# Patient Record
Sex: FEMALE | Race: WHITE | Employment: OTHER | URBAN - METROPOLITAN AREA
[De-identification: names, ages, dates, MRNs, and addresses within clinical notes are randomized per-mention and may not be internally consistent; named-entity substitution may affect disease eponyms.]

---

## 2021-01-19 ENCOUNTER — APPOINTMENT (OUTPATIENT)
Dept: GENERAL RADIOLOGY | Age: 76
End: 2021-01-19
Payer: MEDICARE

## 2021-01-19 ENCOUNTER — APPOINTMENT (OUTPATIENT)
Dept: CT IMAGING | Age: 76
End: 2021-01-19
Payer: MEDICARE

## 2021-01-19 ENCOUNTER — HOSPITAL ENCOUNTER (EMERGENCY)
Age: 76
Discharge: HOME | End: 2021-01-19
Payer: MEDICARE

## 2021-01-19 VITALS
OXYGEN SATURATION: 98 % | SYSTOLIC BLOOD PRESSURE: 158 MMHG | HEIGHT: 68 IN | DIASTOLIC BLOOD PRESSURE: 73 MMHG | TEMPERATURE: 97.7 F | WEIGHT: 195 LBS | BODY MASS INDEX: 29.55 KG/M2 | RESPIRATION RATE: 16 BRPM | HEART RATE: 60 BPM

## 2021-01-19 DIAGNOSIS — S42.291A OTHER CLOSED DISPLACED FRACTURE OF PROXIMAL END OF RIGHT HUMERUS, INITIAL ENCOUNTER: Primary | ICD-10-CM

## 2021-01-19 PROCEDURE — 73020 X-RAY EXAM OF SHOULDER: CPT

## 2021-01-19 PROCEDURE — 72125 CT NECK SPINE W/O DYE: CPT

## 2021-01-19 PROCEDURE — 73030 X-RAY EXAM OF SHOULDER: CPT

## 2021-01-19 PROCEDURE — 6370000000 HC RX 637 (ALT 250 FOR IP): Performed by: NURSE PRACTITIONER

## 2021-01-19 PROCEDURE — 99285 EMERGENCY DEPT VISIT HI MDM: CPT

## 2021-01-19 PROCEDURE — 73080 X-RAY EXAM OF ELBOW: CPT

## 2021-01-19 PROCEDURE — 70450 CT HEAD/BRAIN W/O DYE: CPT

## 2021-01-19 RX ORDER — RIVAROXABAN 20 MG/1
TABLET, FILM COATED ORAL
COMMUNITY
Start: 2020-12-29

## 2021-01-19 RX ORDER — HYDROCODONE BITARTRATE AND ACETAMINOPHEN 5; 325 MG/1; MG/1
1 TABLET ORAL EVERY 6 HOURS PRN
Qty: 12 TABLET | Refills: 0 | Status: SHIPPED | OUTPATIENT
Start: 2021-01-19 | End: 2021-01-22

## 2021-01-19 RX ORDER — HYDROCODONE BITARTRATE AND ACETAMINOPHEN 5; 325 MG/1; MG/1
1 TABLET ORAL ONCE
Status: COMPLETED | OUTPATIENT
Start: 2021-01-19 | End: 2021-01-19

## 2021-01-19 RX ORDER — ACETAMINOPHEN 500 MG
1000 TABLET ORAL ONCE
Status: COMPLETED | OUTPATIENT
Start: 2021-01-19 | End: 2021-01-19

## 2021-01-19 RX ORDER — ONDANSETRON 4 MG/1
4 TABLET, ORALLY DISINTEGRATING ORAL EVERY 8 HOURS PRN
Qty: 20 TABLET | Refills: 0 | Status: SHIPPED | OUTPATIENT
Start: 2021-01-19

## 2021-01-19 RX ORDER — CYCLOBENZAPRINE HCL 10 MG
5 TABLET ORAL ONCE
Status: COMPLETED | OUTPATIENT
Start: 2021-01-19 | End: 2021-01-19

## 2021-01-19 RX ADMIN — CYCLOBENZAPRINE 5 MG: 10 TABLET, FILM COATED ORAL at 15:03

## 2021-01-19 RX ADMIN — HYDROCODONE BITARTRATE AND ACETAMINOPHEN 1 TABLET: 5; 325 TABLET ORAL at 15:03

## 2021-01-19 RX ADMIN — ACETAMINOPHEN 1000 MG: 500 TABLET ORAL at 13:57

## 2021-01-19 ASSESSMENT — PAIN DESCRIPTION - LOCATION: LOCATION: ARM;NECK

## 2021-01-19 ASSESSMENT — PAIN SCALES - GENERAL: PAINLEVEL_OUTOF10: 9

## 2021-01-19 ASSESSMENT — PAIN DESCRIPTION - PAIN TYPE: TYPE: ACUTE PAIN

## 2021-01-19 NOTE — ED NOTES
Bed: 26  Expected date: 1/19/21  Expected time: 12:32 PM  Means of arrival: [facility]  Comments:  UTOBDULIO [address], RN  01/19/21 7684

## 2021-01-19 NOTE — ED NOTES
Discharge paperwork given to and reviewed with pt. Pt verbalized understanding and all questions answered. Pt encouraged to return if having worsening symptoms or new symptoms discussed in discharge paperwork. Pt to follow up with Orthopedics  Rx x 2 given and medications reviewed with pt. Pt in NAD, RR even and unlabored. Pt off unit ambulatory to spouse's car.      Blayne Roberts RN  01/19/21 1252

## 2021-01-19 NOTE — ED PROVIDER NOTES
[facility] Emergency Department    CHIEF COMPLAINT  Fall (Tripped over dog toy), Neck Injury (Hit neck on piano, on Xarelto. C-collar on and aligned by EMS. ), Shoulder Pain, and Arm Pain      HISTORY OF PRESENT ILLNESS  Aubrey Boyd is a 70 y.o. female who presents to the ED complaining of right-sided neck and right shoulder pain after tripping over a dog toy and falling onto her right shoulder. Patient is right-hand dominant. Patient is on Xarelto for history of atrial fibrillation. Patient's arrived by EMS with a cervical collar in place. Patient denies head injury or loss of consciousness. Patient denies any other injury. No other complaints, modifying factors or associated symptoms. Nursing notes reviewed.    Past Medical History:   Diagnosis Date    Arthritis     Atrial fibrillation ([address]     HTN (hypertension)      Past Surgical History:   Procedure Laterality Date    APPENDECTOMY      COLONOSCOPY      HYSTERECTOMY      NASAL ENDOSCOPY      TONSILLECTOMY       Family History   Problem Relation Age of Onset    High Blood Pressure Mother     High Blood Pressure Father     Cancer Brother     High Blood Pressure Brother      Social History     Socioeconomic History    Marital status:      Spouse name: Not on file    Number of children: Not on file    Years of education: Not on file    Highest education level: Not on file   Occupational History    Not on file   Social Needs    Financial resource strain: Not on file    Food insecurity     Worry: Not on file     Inability: Not on file    Transportation needs     Medical: Not on file     Non-medical: Not on file   Tobacco Use    Smoking status: Former Smoker     Packs/day: 1.00     Years: 4.00     Pack years: 4.00    Smokeless tobacco: Never Used   Substance and Sexual Activity    Alcohol use: No     Comment: OCCASSIONALLY    Drug use: Not on file    Sexual activity: Not on file Lifestyle    Physical activity     Days per week: Not on file     Minutes per session: Not on file    Stress: Not on file   Relationships    Social connections     Talks on phone: Not on file     Gets together: Not on file     Attends Gnosticist service: Not on file     Active member of club or organization: Not on file     Attends meetings of clubs or organizations: Not on file     Relationship status: Not on file    Intimate partner violence     Fear of current or ex partner: Not on file     Emotionally abused: Not on file     Physically abused: Not on file     Forced sexual activity: Not on file   Other Topics Concern    Not on file   Social History Narrative    Not on file     No current facility-administered medications for this encounter. Current Outpatient Medications   Medication Sig Dispense Refill    HYDROcodone-acetaminophen (NORCO) 5-325 MG per tablet Take 1 tablet by mouth every 6 hours as needed for Pain for up to 3 days. Intended supply: 3 days. Take lowest dose possible to manage pain 12 tablet 0    ondansetron (ZOFRAN ODT) 4 MG disintegrating tablet Take 1 tablet by mouth every 8 hours as needed for Nausea 20 tablet 0    XARELTO 20 MG TABS tablet       methocarbamol (ROBAXIN) 500 MG tablet Take 1 tablet by mouth 3 times daily. 15 tablet 0    traMADol (ULTRAM) 50 MG tablet Take 1 tablet by mouth every 6 hours as needed for Pain. 10 tablet 0    atenolol-chlorthalidone (TENORETIC) 50-25 MG per tablet Take 1 tablet by mouth daily. Alternate 1 tablet and .5 tablet  Every other day      aspirin 81 MG tablet Take 81 mg by mouth daily.  Probiotic Product (PROBIOTIC FORMULA PO) Take 2 tablets by mouth daily. Natures bounty brand       Glucosamine 750 MG TABS Take 2,000 mg by mouth daily.  FISH OIL Take 1,200 mg by mouth daily.          Allergies   Allergen Reactions    Ciprofloxacin Anaphylaxis       REVIEW OF SYSTEMS EXAMINATION: FOUR XRAY VIEWS OF THE RIGHT SHOULDER; THREE XRAY VIEWS OF THE RIGHT ELBOW 1/19/2021 10:16 am; 1/19/2021 10:15 am COMPARISON: None. HISTORY: ORDERING SYSTEM PROVIDED HISTORY: Injury TECHNOLOGIST PROVIDED HISTORY: Reason for exam:->Injury Reason for Exam: RIGHT SHOULDER INJURY; FALL Acuity: Acute Type of Exam: Initial; ORDERING SYSTEM PROVIDED HISTORY: fall TECHNOLOGIST PROVIDED HISTORY: Reason for exam:->fall Reason for Exam: FALL; SHOULDER/ ELBOW PAIN Acuity: Acute Type of Exam: Initial FINDINGS: Right shoulder: There is a comminuted and impacted proximal right humerus fracture with mild displacement of the greater tuberosity. No appreciable angulation. The humeral head appears to remain appropriately seated in the glenoid fossa without dislocation. Background mild glenohumeral and moderate acromioclavicular joint degenerative changes. Elbow: Evaluation is somewhat limited by inability to optimally position the patient. The elbow appears grossly intact with mild degenerative spurring at the sublime tubercle. No effusion. Nonspecific 2 mm radiodensity in the dorsal soft tissues of the upper forearm may be vascular. No adjacent soft tissue gas or irregularity to suggest retained foreign body. Comminuted and impacted proximal right humerus fracture with mild displacement of the greater tuberosity. No gross elbow fracture with evaluation mildly limited by inability to optimally position the patient.      Xr Elbow Right (min 3 Views)    Result Date: 1/19/2021 EXAMINATION: FOUR XRAY VIEWS OF THE RIGHT SHOULDER; THREE XRAY VIEWS OF THE RIGHT ELBOW 1/19/2021 10:16 am; 1/19/2021 10:15 am COMPARISON: None. HISTORY: ORDERING SYSTEM PROVIDED HISTORY: Injury TECHNOLOGIST PROVIDED HISTORY: Reason for exam:->Injury Reason for Exam: RIGHT SHOULDER INJURY; FALL Acuity: Acute Type of Exam: Initial; ORDERING SYSTEM PROVIDED HISTORY: fall TECHNOLOGIST PROVIDED HISTORY: Reason for exam:->fall Reason for Exam: FALL; SHOULDER/ ELBOW PAIN Acuity: Acute Type of Exam: Initial FINDINGS: Right shoulder: There is a comminuted and impacted proximal right humerus fracture with mild displacement of the greater tuberosity. No appreciable angulation. The humeral head appears to remain appropriately seated in the glenoid fossa without dislocation. Background mild glenohumeral and moderate acromioclavicular joint degenerative changes. Elbow: Evaluation is somewhat limited by inability to optimally position the patient. The elbow appears grossly intact with mild degenerative spurring at the sublime tubercle. No effusion. Nonspecific 2 mm radiodensity in the dorsal soft tissues of the upper forearm may be vascular. No adjacent soft tissue gas or irregularity to suggest retained foreign body. Comminuted and impacted proximal right humerus fracture with mild displacement of the greater tuberosity. No gross elbow fracture with evaluation mildly limited by inability to optimally position the patient.      Ct Head Wo Contrast    Result Date: 1/19/2021 EXAMINATION: CT OF THE HEAD WITHOUT CONTRAST  1/19/2021 1:28 pm TECHNIQUE: CT of the head was performed without the administration of intravenous contrast. Dose modulation, iterative reconstruction, and/or weight based adjustment of the mA/kV was utilized to reduce the radiation dose to as low as reasonably achievable. COMPARISON: None. HISTORY: ORDERING SYSTEM PROVIDED HISTORY: fall TECHNOLOGIST PROVIDED HISTORY: Reason for exam:->fall Has a \"code stroke\" or \"stroke alert\" been called? ->No Reason for Exam: fall today; hit neck on right side, no LOC Acuity: Acute Type of Exam: Initial FINDINGS: BRAIN/VENTRICLES: The ventricles and sulci are normal.  No extra-axial fluid collections, and no sign of recent intracranial hemorrhage. Decreased attenuation is noted within the periventricular white matter. Pattern is consistent with chronic small vessel ischemic change. No acute edema or mass effect. No mass lesions are detected. ORBITS: The visualized portion of the orbits demonstrate no acute abnormality. SINUSES: The visualized paranasal sinuses and mastoid air cells demonstrate no acute abnormality. SOFT TISSUES/SKULL:  No acute abnormality of the visualized skull or soft tissues. No acute intracranial abnormality.      Ct Cervical Spine Wo Contrast    Result Date: 1/19/2021 EXAMINATION: CT OF THE CERVICAL SPINE WITHOUT CONTRAST 1/19/2021 1:28 pm TECHNIQUE: CT of the cervical spine was performed without the administration of intravenous contrast. Multiplanar reformatted images are provided for review. Dose modulation, iterative reconstruction, and/or weight based adjustment of the mA/kV was utilized to reduce the radiation dose to as low as reasonably achievable. COMPARISON: None. HISTORY: ORDERING SYSTEM PROVIDED HISTORY: fall TECHNOLOGIST PROVIDED HISTORY: If patient is on cardiac monitor and/or pulse ox, they may be taken off cardiac monitor and pulse ox, left on O2 if currently on. All monitors reattached when patient returns to room. Reason for exam:->fall Reason for Exam: fall today; hit neck on right side, no LOC Acuity: Acute Type of Exam: Initial FINDINGS: BONES/ALIGNMENT: There is no acute fracture or traumatic malalignment. DEGENERATIVE CHANGES: Severe degenerative disc disease at C5-6. There is moderate spinal canal stenosis and moderate bilateral neuroforaminal stenosis at this level. Moderate degenerative change also present at C6-7. SOFT TISSUES: There is no prevertebral soft tissue swelling. No acute abnormality of the cervical spine. CONSULTS  IP CONSULT TO [address] [address]    ED COURSE/MDM  Patient seen and evaluated. Old records reviewed. Diagnostic testing reviewed and results discussed. I have independently evaluated this patient based upon my scope of practice. Supervising physician was in the department for consultation as needed. Diane Montoya presented to the ED today with above noted complaints. CT of the head and neck negative for intracranial hemorrhage or cervical fracture. Right shoulder x-ray notable for comminuted and impacted proximal right humerus fracture with mild displacement of the greater tuberosity. I discussed these findings with orthopedic surgeon on-call Martha Hare who recommends axial view of right shoulder to further. No dislocation. Right upper extremity placed in a sling. Neurovascular intact after sling placement. Axial view of the right shoulder as orthopedics requested shows no evidence of dislocation. Patient received Norco for pain, with good relief. While in ED patient received   Medications   acetaminophen (TYLENOL) tablet 1,000 mg (1,000 mg Oral Given 1/19/21 1357)   HYDROcodone-acetaminophen (NORCO) 5-325 MG per tablet 1 tablet (1 tablet Oral Given 1/19/21 1503)   cyclobenzaprine (FLEXERIL) tablet 5 mg (5 mg Oral Given 1/19/21 1503)             At this point I do not feel the patient requires further work up and it is reasonable to discharge the patient. A discussion was had with the patient and/or their surrogate regarding diagnosis, diagnostic testing results, treatment/ plan of care, and follow up. There was shared decision-making between myself as well as the patient and/or their surrogate and we are all in agreement with discharge home. There was an opportunity for questions and all questions were answered to the best of my ability and to the satisfaction of the patient and/or patient family. Patient will follow up with ortho for further evaluation/treatment. The patient was given strict return precautions as we discussed symptoms that would necessitate return to the ED. Patient will return to ED for new/worsening symptoms. The patient verbalized their understanding and agreement with the above plan. Please refer to AVS for further details regarding discharge instructions. No results found for this visit on 01/19/21. I estimate there is LOW risk for COMPARTMENT SYNDROME, DEEP VENOUS THROMBOSIS, SEPTIC ARTHRITIS, TENDON OR NEUROVASCULAR INJURY, thus I consider the discharge disposition reasonable. Yakov Pinedo and I have discussed the diagnosis and risks, and we agree with discharging home to follow-up with their primary doctor or the referral orthopedist. We also discussed returning to the Emergency Department immediately if new or worsening symptoms occur. We have discussed the symptoms which are most concerning (e.g., changing or worsening pain, numbness, weakness) that necessitate immediate return. Final Impression    1. Other closed displaced fracture of proximal end of right humerus, initial encounter        Blood pressure (!) 162/83, pulse 54, temperature 97.7 °F (36.5 °C), temperature source Oral, resp. rate 16, height 5' 7.5\" (1.715 m), weight 195 lb (88.5 kg), SpO2 99 %.mdm    Patient was sent home with a prescription for below medication/s. I did [place] patient on appropriate use of these medication. New Prescriptions    HYDROCODONE-ACETAMINOPHEN (NORCO) 5-325 MG PER TABLET    Take 1 tablet by mouth every 6 hours as needed for Pain for up to 3 days. Intended supply: 3 days. Take lowest dose possible to manage pain    ONDANSETRON (ZOFRAN ODT) 4 MG DISINTEGRATING TABLET    Take 1 tablet by mouth every 8 hours as needed for Nausea           FOLLOW UP  Li Membreno MD  [address] [facility] [address] [address] [facility]  ED  [address] [facility] [address] [facility]          DISPOSITION  Patient was discharged to home in good condition. Comment: Please note this report has been produced using speech recognition software and may contain errors related to that system including errors in grammar, punctuation, and spelling, as well as words and phrases that may be inappropriate. If there are any questions or concerns please feel free to contact the dictating provider for clarification.            LAMAR Recinos - PRABHA  01/19/21 6858

## 2021-11-15 ENCOUNTER — CLINICAL DOCUMENTATION (OUTPATIENT)
Dept: OTHER | Age: 76
End: 2021-11-15